# Patient Record
Sex: MALE | Race: WHITE | NOT HISPANIC OR LATINO
[De-identification: names, ages, dates, MRNs, and addresses within clinical notes are randomized per-mention and may not be internally consistent; named-entity substitution may affect disease eponyms.]

---

## 2020-03-24 ENCOUNTER — RX ONLY (RX ONLY)
Age: 55
End: 2020-03-24

## 2020-03-24 RX ORDER — FLUOCINONIDE 0.5 MG/G
1 A SMALL AMOUNT CREAM TOPICAL ONCE A DAY
Qty: 60 | Refills: 3
Start: 2020-03-24

## 2020-06-08 ENCOUNTER — RX ONLY (RX ONLY)
Age: 55
End: 2020-06-08

## 2020-06-08 RX ORDER — FLUOCINONIDE 0.5 MG/ML
1 A SMALL AMOUNT SOLUTION TOPICAL ONCE A DAY
Qty: 60 | Refills: 3
Start: 2020-06-08

## 2020-06-08 RX ORDER — FLUOCINONIDE 0.5 MG/G
1 A SMALL AMOUNT CREAM TOPICAL ONCE A DAY
Qty: 60 | Refills: 3
Start: 2020-06-08

## 2020-09-02 ENCOUNTER — RX ONLY (RX ONLY)
Age: 55
End: 2020-09-02

## 2020-09-02 ENCOUNTER — NEW SKIN PROBLEM (OUTPATIENT)
Dept: URBAN - NONMETROPOLITAN AREA CLINIC 9 | Facility: CLINIC | Age: 55
Setting detail: DERMATOLOGY
End: 2020-09-02

## 2020-09-02 DIAGNOSIS — C44.719 BASAL CELL CARCINOMA OF SKIN OF LEFT LOWER LIMB, INCLUDING HIP: ICD-10-CM

## 2020-09-02 PROCEDURE — 99213 OFFICE O/P EST LOW 20 MIN: CPT

## 2020-09-02 RX ORDER — CALCIPOTRIENE AND BETAMETHASONE DIPROPIONATE 50; .5 UG/G; MG/G
1 A SMALL AMOUNT OINTMENT TOPICAL ONCE A DAY
Qty: 100 | Refills: 4
Start: 2020-09-02

## 2020-09-16 ENCOUNTER — RX ONLY (RX ONLY)
Age: 55
End: 2020-09-16

## 2020-09-16 RX ORDER — CALCIPOTRIENE 50 UG/G
A SMALL AMOUNT CREAM TOPICAL ONCE A DAY
Qty: 60 | Refills: 3
Start: 2020-09-16

## 2020-09-16 RX ORDER — BETAMETHASONE DIPROPIONATE 0.5 MG/G
A SMALL AMOUNT CREAM TOPICAL ONCE A DAY
Qty: 45 | Refills: 3
Start: 2020-09-16

## 2020-11-12 ENCOUNTER — RX ONLY (RX ONLY)
Age: 55
End: 2020-11-12

## 2020-11-12 RX ORDER — FLUOCINONIDE 0.5 MG/G
A SMALL AMOUNT OINTMENT TOPICAL ONCE A DAY
Qty: 60 | Refills: 3
Start: 2020-11-12

## 2020-11-12 RX ORDER — FLUOCINONIDE 0.5 MG/ML
A SMALL AMOUNT SOLUTION TOPICAL ONCE A DAY
Qty: 60 | Refills: 3
Start: 2020-11-12

## 2021-03-02 ENCOUNTER — RX ONLY (RX ONLY)
Age: 56
End: 2021-03-02

## 2021-03-02 ENCOUNTER — FOLLOW UP (OUTPATIENT)
Dept: URBAN - NONMETROPOLITAN AREA CLINIC 9 | Facility: CLINIC | Age: 56
Setting detail: DERMATOLOGY
End: 2021-03-02

## 2021-03-02 DIAGNOSIS — C44.311 BASAL CELL CARCINOMA OF SKIN OF NOSE: ICD-10-CM

## 2021-03-02 PROCEDURE — 99214 OFFICE O/P EST MOD 30 MIN: CPT

## 2022-09-15 ENCOUNTER — APPOINTMENT (OUTPATIENT)
Dept: URBAN - NONMETROPOLITAN AREA CLINIC 40 | Age: 57
Setting detail: DERMATOLOGY
End: 2022-09-18

## 2022-09-15 DIAGNOSIS — D18.0 HEMANGIOMA: ICD-10-CM

## 2022-09-15 DIAGNOSIS — L40.0 PSORIASIS VULGARIS: ICD-10-CM

## 2022-09-15 DIAGNOSIS — L57.3 POIKILODERMA OF CIVATTE: ICD-10-CM

## 2022-09-15 DIAGNOSIS — L57.8 OTHER SKIN CHANGES DUE TO CHRONIC EXPOSURE TO NONIONIZING RADIATION: ICD-10-CM

## 2022-09-15 PROBLEM — D18.01 HEMANGIOMA OF SKIN AND SUBCUTANEOUS TISSUE: Status: ACTIVE | Noted: 2022-09-15

## 2022-09-15 PROCEDURE — OTHER PRESCRIPTION: OTHER

## 2022-09-15 PROCEDURE — 11900 INJECT SKIN LESIONS </W 7: CPT

## 2022-09-15 PROCEDURE — 99214 OFFICE O/P EST MOD 30 MIN: CPT | Mod: 25

## 2022-09-15 PROCEDURE — OTHER COUNSELING: OTHER

## 2022-09-15 PROCEDURE — OTHER INTRALESIONAL KENALOG: OTHER

## 2022-09-15 RX ORDER — FLUOCINONIDE 0.5 MG/ML
SOLUTION TOPICAL
Qty: 60 | Refills: 1 | Status: ERX | COMMUNITY
Start: 2022-09-15

## 2022-09-15 RX ORDER — CLOBETASOL PROPIONATE 0.5 MG/G
CREAM TOPICAL
Qty: 60 | Refills: 3 | Status: ERX | COMMUNITY
Start: 2022-09-15

## 2022-09-15 ASSESSMENT — LOCATION SIMPLE DESCRIPTION DERM
LOCATION SIMPLE: RIGHT ELBOW
LOCATION SIMPLE: RIGHT UPPER BACK
LOCATION SIMPLE: UPPER BACK
LOCATION SIMPLE: ABDOMEN
LOCATION SIMPLE: INFERIOR FOREHEAD
LOCATION SIMPLE: LEFT ANTERIOR NECK
LOCATION SIMPLE: CHEST

## 2022-09-15 ASSESSMENT — LOCATION DETAILED DESCRIPTION DERM
LOCATION DETAILED: RIGHT LATERAL ELBOW
LOCATION DETAILED: INFERIOR MID FOREHEAD
LOCATION DETAILED: INFERIOR THORACIC SPINE
LOCATION DETAILED: EPIGASTRIC SKIN
LOCATION DETAILED: LEFT INFERIOR ANTERIOR NECK
LOCATION DETAILED: LOWER STERNUM
LOCATION DETAILED: RIGHT MEDIAL SUPERIOR CHEST
LOCATION DETAILED: RIGHT MID-UPPER BACK

## 2022-09-15 ASSESSMENT — LOCATION ZONE DERM
LOCATION ZONE: FACE
LOCATION ZONE: TRUNK
LOCATION ZONE: NECK
LOCATION ZONE: ARM

## 2022-09-15 NOTE — PROCEDURE: INTRALESIONAL KENALOG
Show Inventory Tab: Hide
Include Z78.9 (Other Specified Conditions Influencing Health Status) As An Associated Diagnosis?: No
Detail Level: Detailed
Total Volume Injected (Ccs- Only Use Numbers And Decimals): 0.5
Concentration Of Solution Injected (Mg/Ml): 2.5
X Size Of Lesion In Cm (Optional): 0
Validate Note Data When Using Inventory: Yes
Medical Necessity Clause: This procedure was medically necessary because the lesions that were treated were:
Kenalog Preparation: Kenalog
Consent: The risks of atrophy were reviewed with the patient.

## 2023-03-15 ENCOUNTER — APPOINTMENT (OUTPATIENT)
Dept: URBAN - NONMETROPOLITAN AREA CLINIC 40 | Age: 58
Setting detail: DERMATOLOGY
End: 2023-03-16
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
## 2023-03-15 DIAGNOSIS — L40.0 PSORIASIS VULGARIS: ICD-10-CM
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
## 2023-03-15 PROCEDURE — OTHER PRESCRIPTION: OTHER

## 2023-03-15 PROCEDURE — OTHER PRESCRIPTION MEDICATION MANAGEMENT: OTHER

## 2023-03-15 PROCEDURE — OTHER COUNSELING: OTHER

## 2023-03-15 PROCEDURE — 99213 OFFICE O/P EST LOW 20 MIN: CPT

## 2023-03-15 RX ORDER — BETAMETHASONE DIPROPIONATE 0.5 MG/G
OINTMENT TOPICAL
Qty: 45 | Refills: 2 | Status: ERX | COMMUNITY
Start: 2023-03-15

## 2023-03-15 RX ORDER — TACROLIMUS 0.3 MG/G
OINTMENT TOPICAL
Qty: 30 | Refills: 0 | Status: ERX | COMMUNITY
Start: 2023-03-15

## 2023-03-15 ASSESSMENT — LOCATION DETAILED DESCRIPTION DERM
LOCATION DETAILED: RIGHT SUPERIOR FOREHEAD
LOCATION DETAILED: RIGHT SUPERIOR UPPER BACK
LOCATION DETAILED: RIGHT POSTERIOR ANKLE
LOCATION DETAILED: LEFT DISTAL PRETIBIAL REGION

## 2023-03-15 ASSESSMENT — LOCATION SIMPLE DESCRIPTION DERM
LOCATION SIMPLE: RIGHT UPPER BACK
LOCATION SIMPLE: LEFT PRETIBIAL REGION
LOCATION SIMPLE: RIGHT FOREHEAD
LOCATION SIMPLE: RIGHT ANKLE

## 2023-03-15 ASSESSMENT — LOCATION ZONE DERM
LOCATION ZONE: FACE
LOCATION ZONE: TRUNK
LOCATION ZONE: LEG

## 2023-03-15 NOTE — PROCEDURE: PRESCRIPTION MEDICATION MANAGEMENT
Discontinue Regimen: Fluocinonide solution and cream. Will hold x months then resume discussed rotational topical therapy.
Initiate Treatment: Tacrolimus and Betamethasone diponate
Render In Strict Bullet Format?: No
Detail Level: Zone
Plan: Limited personal tanning bed use.

## 2023-03-15 NOTE — PROCEDURE: COUNSELING
Detail Level: Detailed
99
Detail Level: Generalized
Patient Specific Counseling (Will Not Stick From Patient To Patient): Purchased tanning bed for winter psoriasis care.

## 2024-03-19 ENCOUNTER — APPOINTMENT (OUTPATIENT)
Dept: URBAN - NONMETROPOLITAN AREA CLINIC 40 | Age: 59
Setting detail: DERMATOLOGY
End: 2024-03-28

## 2024-03-19 DIAGNOSIS — L40.0 PSORIASIS VULGARIS: ICD-10-CM
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
## 2024-03-19 PROCEDURE — OTHER ADDITIONAL NOTES: OTHER

## 2024-03-19 PROCEDURE — OTHER PRESCRIPTION MEDICATION MANAGEMENT: OTHER

## 2024-03-19 PROCEDURE — 99213 OFFICE O/P EST LOW 20 MIN: CPT

## 2024-03-19 PROCEDURE — OTHER COUNSELING: OTHER

## 2024-03-19 PROCEDURE — OTHER PRESCRIPTION: OTHER

## 2024-03-19 RX ORDER — FLUOCINONIDE 0.5 MG/ML
SOLUTION TOPICAL
Qty: 1 | Refills: 1 | Status: ERX | COMMUNITY
Start: 2024-03-19

## 2024-03-19 ASSESSMENT — LOCATION ZONE DERM
LOCATION ZONE: FACE
LOCATION ZONE: ARM

## 2024-03-19 ASSESSMENT — LOCATION SIMPLE DESCRIPTION DERM
LOCATION SIMPLE: RIGHT FOREHEAD
LOCATION SIMPLE: LEFT FOREARM

## 2024-03-19 ASSESSMENT — LOCATION DETAILED DESCRIPTION DERM
LOCATION DETAILED: LEFT PROXIMAL DORSAL FOREARM
LOCATION DETAILED: RIGHT SUPERIOR FOREHEAD

## 2024-03-19 ASSESSMENT — ITCH NUMERIC RATING SCALE: HOW SEVERE IS YOUR ITCHING?: 7

## 2024-03-19 NOTE — PROCEDURE: COUNSELING
Detail Level: Detailed
Detail Level: Generalized
Patient Specific Counseling (Will Not Stick From Patient To Patient): Purchased tanning bed for winter psoriasis care.

## 2024-03-19 NOTE — PROCEDURE: PRESCRIPTION MEDICATION MANAGEMENT
Discontinue Regimen: Betamethasone
Continue Regimen: Fluocinonide solution on scalp
Render In Strict Bullet Format?: No
Detail Level: Zone
Samples Given: VTAMA